# Patient Record
Sex: MALE | Race: WHITE
[De-identification: names, ages, dates, MRNs, and addresses within clinical notes are randomized per-mention and may not be internally consistent; named-entity substitution may affect disease eponyms.]

---

## 2018-03-30 ENCOUNTER — HOSPITAL ENCOUNTER (EMERGENCY)
Dept: HOSPITAL 76 - ED | Age: 53
Discharge: HOME | End: 2018-03-30
Payer: COMMERCIAL

## 2018-03-30 VITALS — DIASTOLIC BLOOD PRESSURE: 73 MMHG | SYSTOLIC BLOOD PRESSURE: 144 MMHG

## 2018-03-30 DIAGNOSIS — S61.253A: ICD-10-CM

## 2018-03-30 DIAGNOSIS — R03.0: ICD-10-CM

## 2018-03-30 DIAGNOSIS — Z23: ICD-10-CM
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
## 2018-03-30 DIAGNOSIS — S61.452A: Primary | ICD-10-CM

## 2018-03-30 PROCEDURE — 12002 RPR S/N/AX/GEN/TRNK2.6-7.5CM: CPT

## 2018-03-30 PROCEDURE — 99283 EMERGENCY DEPT VISIT LOW MDM: CPT

## 2018-03-30 PROCEDURE — 73130 X-RAY EXAM OF HAND: CPT

## 2018-03-30 PROCEDURE — 90715 TDAP VACCINE 7 YRS/> IM: CPT

## 2018-03-30 PROCEDURE — 90471 IMMUNIZATION ADMIN: CPT

## 2018-03-30 NOTE — ED PHYSICIAN DOCUMENTATION
PD HPI UPPER EXT INJURY





- Stated complaint


Stated Complaint: DOG BITE





- Chief complaint


Chief Complaint: Laceration





- History obtained from


History obtained from: Patient





- History of Present Illness


Location: Other (Right-handed gentleman with unknown tetanus status was 

breaking up a fight between his own and another dog just prior to arrival near 

home in [place] and has multiple cuts and punctures over the left hand.  No 

other injuries.)





Review of Systems


Constitutional: denies: Fever, Chills


Cardiac: reports: Reviewed and negative


Respiratory: reports: Reviewed and negative


GI: reports: Reviewed and negative





PD PAST MEDICAL HISTORY





- Past Medical History


Neuro: None


Endocrine/Autoimmune: None





- Past Surgical History


Past Surgical History: No





- Present Medications


Home Medications: 


 Ambulatory Orders











 Medication  Instructions  Recorded  Confirmed


 


FLUoxetine [PROzac] 10 mg 12/05/16 


 


Mometasone Furoate [Nasonex] 17 gm NS DAILY 12/05/16 12/05/16


 


Amox/Clav 875/125 [Augmentin] 1 each PO Q12H #14 tablet 03/30/18 


 


Levothyroxine [Synthroid] 20 mcg PO QDAC 03/30/18 03/30/18














- Allergies


Allergies/Adverse Reactions: 


 Allergies











Allergy/AdvReac Type Severity Reaction Status Date / Time


 


No Known Drug Allergies Allergy   Verified 03/30/18 15:48














- Social History


Does the pt smoke?: No


Smoking Status: Never smoker





- Immunizations


Immunizations are current?: Yes





PD ED PE NORMAL





- Vitals


Vital signs reviewed: Yes





- General


General: Alert and oriented X 3, No acute distress





- Extremities


Extremities: Other (There is a 2 cm laceration on the ulnar side of the dorsal 

hand proximal to the fifth MCP and another smaller one just distal that.  There 

are multiple punctures about the left ring finger.  There is a 1-1/2 cm 

laceration over the dorsum of the left middle finger at the level of the PIP 

and there is a little lac near the medial nailbed of the 4th finger.)





- Neuro


Neuro: Alert and oriented X 3, Normal speech





Results





- Vitals


Vitals: 


 Vital Signs - 24 hr











  03/30/18





  15:44


 


Temperature 36.8 C


 


Heart Rate 72


 


Respiratory 12





Rate 


 


Blood Pressure 135/67 H


 


O2 Saturation 99








 Oxygen











O2 Source                      Room air

















Procedures





- Laceration (location)


  ** L hand


Length in cm: 3


Wound type: Other (1 1cm lac in the middle finger, NVI and 1 2cm lac prox to 

5th MCP dorsal hand)


Neurovascular status: Sensory intact, Motor intact, Vascular intact


Tendon involvement: Tendon intact


Anesthesia: Lidocaine 1%


Wound Preparation: Hibiclens, Irrigated copiously NS


Skin layer closure: Nylon, Size #-0 - enter number (4-0), Sutures - enter # (5, 

2 in the middle finger and 3 in the dorsal hand)


Other: Tetanus booster given


Complexity: Simple





Departure





- Departure


Disposition: 01 Home, Self Care


Clinical Impression: 


Dog bite, hand


Qualifiers:


 Encounter type: initial encounter Laterality: left Qualified Code(s): S61.452A 

- Open bite of left hand, initial encounter; W54.0XXA - Bitten by dog, initial 

encounter; W54.0XXA - Bitten by dog, initial encounter





Record reviewed to determine appropriate education?: Yes


Instructions:  ED Laceration Hand


Prescriptions: 


Amox/Clav 875/125 [Augmentin] 1 each PO Q12H #14 tablet


Comments: 


Come back for any signs of infection which would include: Redness, swelling, 

drainage, increased pain, or fevers.


Follow-up with your physician in 14 days for suture removal.





Your blood pressure was elevated today on check into the emergency department.  

This does not mean that you have hypertension, it is a common phenomenon to 

come to the emergency department and have elevated blood pressure.  I recommend 

that you see your primary care physician within the week to have it rechecked 

when you are feeling better.

## 2018-03-30 NOTE — XRAY REPORT
EXAM:

LEFT HAND RADIOGRAPHY

 

EXAM DATE: 3/30/2018 04:38 PM.

 

CLINICAL HISTORY: Dog bite to left hand. Lacerations and pain to fourth digit.

 

COMPARISON: None.

 

TECHNIQUE: 3 views. 4 films.

 

FINDINGS: 

Bones: Normal. No fractures or bone lesions.

 

Joints: Normal. No subluxations.

 

Soft Tissues: Normal. No soft tissue swelling. No radiopaque foreign body.

 

IMPRESSION: Normal left hand radiography.

 

[facility]

Referring Provider Line: [phone]

 

SITE ID: [id number]

## 2018-10-31 ENCOUNTER — HOSPITAL ENCOUNTER (OUTPATIENT)
Dept: HOSPITAL 76 - DI | Age: 53
Discharge: HOME | End: 2018-10-31
Attending: NEUROLOGICAL SURGERY
Payer: COMMERCIAL

## 2018-10-31 DIAGNOSIS — R13.10: Primary | ICD-10-CM

## 2018-10-31 PROCEDURE — 74230 X-RAY XM SWLNG FUNCJ C+: CPT

## 2018-11-02 NOTE — XRAY REPORT
Reason:  DYSPHAGIA

Procedure Date:  10/31/2018   

Accession Number:  [id number] / [id number]                    

Procedure:  FL  - Modified Barium Swallow W/SP CPT Code:  

 

FULL RESULT:

 

 

EXAM:

MODIFIED BARIUM SWALLOW

 

EXAM DATE: 10/31/2018 01:45 PM.

 

CLINICAL HISTORY: Dysphagia.

 

COMPARISON: None.

 

TECHNIQUE: Under the direction of speech pathology, patient swallowed 

various consistencies of barium under lateral fluoroscopic observation of 

the neck.

Fluoroscopy Time: 33 seconds.

Number of Images: 23.

 

FINDINGS:

Swallowing Mechanism: Normal oral phase and swallowing reflex.

 

Airway Protection: Normal epiglottic motion. No episodes of tracheal 

penetration or aspiration with all consistencies of barium.

 

Pharynx: Normal. No significant vallecular or piriform sinus contrast 

pooling.

 

Other: None.

IMPRESSION:

Normal modified barium swallow. No aspiration identified.

 

[facility]

## 2019-02-16 ENCOUNTER — HOSPITAL ENCOUNTER (OUTPATIENT)
Dept: HOSPITAL 76 - DI | Age: 54
Discharge: HOME | End: 2019-02-16
Attending: FAMILY MEDICINE
Payer: COMMERCIAL

## 2019-02-16 DIAGNOSIS — K40.90: Primary | ICD-10-CM

## 2019-02-16 PROCEDURE — 76857 US EXAM PELVIC LIMITED: CPT

## 2019-02-16 NOTE — ULTRASOUND REPORT
Reason:  R INGUINAL HERNIA, REDUCABLE

Procedure Date:  02/16/2019   

Accession Number:  [id number] / [id number]                    

Procedure:  US  - Pelvic Limited or F/U CPT Code:  

 

FULL RESULT:

 

 

EXAM:

INGUINAL ULTRASOUND

 

EXAM DATE: 2/16/2019 05:06 AM.

 

CLINICAL HISTORY: R INGUINAL HERNIA, REDUCABLE.

 

COMPARISON: None.

 

TECHNIQUE: Real-time sonographic imaging of the inguinal canals and 

vascular structures, including color-flow, was performed by the 

sonographer. Multiple representative static images were saved for review.

 

FINDINGS:

Hernia: Fat-containing right inguinal hernia, with the hernia neck 

measuring 1.7 cm. No bowel herniation identified. Hernia is seen during 

Valsalva, and reduces post-Valsalva.

 

Soft Tissues: Normal. No fluid collections or adenopathy.

 

Other: None.

IMPRESSION: Fat-containing right inguinal hernia. No evidence of bowel 

herniation.

 

[facility]

## 2019-03-19 ENCOUNTER — HOSPITAL ENCOUNTER (OUTPATIENT)
Dept: HOSPITAL 76 - SDS | Age: 54
Discharge: HOME | End: 2019-03-19
Attending: SURGERY
Payer: COMMERCIAL

## 2019-03-19 VITALS — DIASTOLIC BLOOD PRESSURE: 78 MMHG | SYSTOLIC BLOOD PRESSURE: 123 MMHG

## 2019-03-19 DIAGNOSIS — G47.30: ICD-10-CM

## 2019-03-19 DIAGNOSIS — Z79.51: ICD-10-CM

## 2019-03-19 DIAGNOSIS — E03.9: ICD-10-CM

## 2019-03-19 DIAGNOSIS — F41.0: ICD-10-CM

## 2019-03-19 DIAGNOSIS — F32.9: ICD-10-CM

## 2019-03-19 DIAGNOSIS — J45.909: ICD-10-CM

## 2019-03-19 DIAGNOSIS — K40.90: Primary | ICD-10-CM

## 2019-03-19 PROCEDURE — 0VBF4ZZ EXCISION OF RIGHT SPERMATIC CORD, PERCUTANEOUS ENDOSCOPIC APPROACH: ICD-10-PCS | Performed by: SURGERY

## 2019-03-19 PROCEDURE — 49650 LAP ING HERNIA REPAIR INIT: CPT

## 2019-03-19 PROCEDURE — 0YU54JZ SUPPLEMENT RIGHT INGUINAL REGION WITH SYNTHETIC SUBSTITUTE, PERCUTANEOUS ENDOSCOPIC APPROACH: ICD-10-PCS | Performed by: SURGERY

## 2019-03-19 NOTE — ANESTHESIA
Pre-Anesthesia VS, & Labs





- Diagnosis





R inguinal hernia





- Procedure





R laparoscopic inguinal hernia repair, possible open





                                        





Height                           5 ft 9 in


Body Mass Index                  28.0











                                        





Height                           5 ft 9 in


Body Mass Index                  28.0











- NPO


>8 hours





Home Medications and Allergies


Home Medications: 


Ambulatory Orders





Albuterol Sulfate [Proair Hfa Inhaler] 1 - 2 puffs INH Q4H PRN 03/07/19 


Beclomethasone Dipropionate [Qvar Redihaler (40 mcg)] 2 puffs IH BID 03/07/19 


Cyclobenzaprine [Flexeril] 10 mg PO TID PRN 03/07/19 


LORazepam [Ativan] 0.5 mg PO ONCE PRN 03/07/19 


Liothyronine [Cytomel] 5 mcg PO QDAC 03/07/19 


Sildenafil Citrate [Viagra] 100 mg PO ONCE PRN 03/07/19 











                                        





FLUoxetine [PROzac] 20 mg PO DAILY 12/05/16 


Mometasone Furoate [Nasonex] 1 spray NS BID PRN 12/05/16 


Albuterol Sulfate [Proair Hfa Inhaler] 1 - 2 puffs INH Q4H PRN 03/07/19 


Beclomethasone Dipropionate [Qvar Redihaler (40 mcg)] 2 puffs IH BID 03/07/19 


Cyclobenzaprine [Flexeril] 10 mg PO TID PRN 03/07/19 


LORazepam [Ativan] 0.5 mg PO ONCE PRN 03/07/19 


Liothyronine [Cytomel] 5 mcg PO QDAC 03/07/19 


Sildenafil Citrate [Viagra] 100 mg PO ONCE PRN 03/07/19 








Allergies/Adverse Reactions: 


                                    Allergies











Allergy/AdvReac Type Severity Reaction Status Date / Time


 


latex Allergy  swelling Verified 03/07/19 11:57














Anes History & Medical History





- Anesthetic History


Anesthesia Complications: reports: No previous complications


Family history of Anesthesia Complications: Denies


Family history of Malignant Hyperthermia: Denies





- Medical History


Cardiovascular: reports: None


Pulmonary: reports: Asthma, Sleep apnea, CPAP use


Gastrointestinal: reports: Ulcers


Urinary: reports: Kidney stones


Musculoskeletal: reports: Chronic back pain


Endocrine/Autoimmune: reports: HyPOthyroidism


Skin: reports: None


Smoking Status: Never smoker





- Surgical History


Orthopedic: Rotator cuff repair, Arthroscopic surgery, Spine surgery





Exam


General: Alert, Oriented x3, Cooperative


Dental: WNL


Mouth Opening: 3 Fingerbreadth


Neck Mobility: Reduced


Mallampati classification: II


Thyromental Distance: 4-6 cm


Respiratory: Lungs clear, Normal breath sounds, No respiratory distress, No 

accessory muscle use


Cardiovascular: Regular rate


Neurological: Normal speech


Mental/Cognitive Status: Alert/Oriented X3


Cognitive Status: Within normal limits





Plan


Anesthesia Type: General


Consent for Procedure(s) Verified and Reviewed: Yes


Code Status: Attempt Resuscitation


ASA classification: 2-Mild systemic disease


Is this case an emergency?: No

## 2019-03-19 NOTE — OPERATIVE REPORT
Operative Report





- General


Procedure Date: 03/19/19


Planned Procedure: 


Laparoscopic right inguinal herniorrhaphy possible open right inguinal 

herniorrhaphy


Pre-Op Diagnosis: Right inguinal hernia


Procedure Performed: 


Laparoscopic (TEP) right inguinal herniorrhaphy and removal of right inguinal 

canal cord lipoma


Post Op Diagnosis: Right indirect inguinal hernia and cord lipoma





- Procedure Note


Primary Surgeon: Joel Sauer MD


Anesthesia Provider: Sukhwinder Rios CRNA and Renato Seth CRNA


Anesthesia Technique: General ET tube, Local (30 mL of half percent Marcaine)


IV Fluids (mL): 1,200


Estimated Blood Loss (mL): 20


Drain/Tube Type: Other (None.)


Complications: 


None.





- Other


Other Information/Narrative: 


OPERATIVE DESCRIPTION/REPORT:





After verbal and written informed consent was obtained detailing the risks of 

infection, bleeding requiring transfusion with its risks, nerve injury, and 

death, and after I met with the patient confirming the surgery and the site of 

the surgery, the patient was brought to the operative suite and placed supine on

the operating table.  Great care was taken to avoid pressure points to prevent 

pressure necrosis or nerve injury.  Monitoring devices were applied along with 

TEDs and pneumatic compressive stockings (to prevent DVT). The patient received 

preoperative antibiotics for surgical prophylaxis.  Sukhwinder Rios CRNA and Renato Seth CRNA sedated and induced general anesthesia and provided anesthesia 

care for the entirety of the case (Renato Lewis completed).  The patient 

was prepped and draped in the usual sterile manner.  With the patient draped my 

initials were clearly visible.  A "time in" then confirmed that the patient was 

identified with 3 identifiers (name, birth date and medical record number), the 

history and physical was in the chart, the signed consent confirming the 

procedure was in the chart, the patient was in the correct position, the 

aforementioned prophylactic measures were in place or given, we had the correct 

personnel and equipment to complete the procedure and that anesthesia, surgery 

and nursing were given an opportunity to express any concerns.  With the 

agreement of everyone in the room, we proceeded with the operation.





A transverse skin incision was made below and to the right of the umbilicus to a

length of approximately 3 cm. The incision was carried through the subcutaneous 

tissue. Bleeders were cauterized. The right rectus sheath was identified and 

incised lateral to the midline. The preperitoneal space was then developed 

following insertion of a Spacemaker balloon, which was inflated under direct 

vision.  The balloon would not inflate fully on the right hand side and in order

to get the right side of the balloon to inflate additional pressure was used to 

the point where the balloon popped but it still did not dissect the right hand 

side.





Following removal of the Spacemaker balloon, a #10 trocar was placed in the 

preperitoneal space and the preperitoneal space was insufflated with CO2 to a 

steady state pressure of 15 mmHg. A 5 mm 30 degree laparoscope was inserted in 

the preperitoneal space.  Two #5 trocars were placed in the lower midline 5 cm 

and 10 cm away from the umbilical incision under direct vision and without 

incident.  Landmarks including symphysis pubis, right and left Freedom ligaments 

and right and left inferior epigastric vessels were identified. Dissection was 

then continued lateral to the transverse abdominis muscle bilaterally. 





The right side was addressed first.  The internal ring was then explored for the

presence of the indirect hernia sac and a very large cord lipoma and a 

relatively small indirect hernia sac were found. Exploration of the medial space

showed no defect consistent with a direct hernia.  A [employer]ien ProGrip right 

inguinal mesh (reference# [id number], lot# [id number], used by date 2021-08-31) was

placed in the preperitoneal space and unrolled to cover the internal ring as 

well as the direct space.  Great care was taken to ensure that the peritoneumas 

well as the large cord lipoma was swept down so that it could not "creep" behind

the mesh and result in a recurrent hernia.





The left side was then addressed.  No direct nor indirect hernia was seen.





30 mL of 1/2% Marcaine was injected at all 3 port sites both deep and 

superficially under direct vision.  





The preperitoneal space was then deflated and during the deflation the mesh was 

watched to ensure that it was sandwiched nicely in place and did not change 

position. All trocars were withdrawn.  The defect in the rectus sheath was 

closed with a running 0 Vicryl suture . The skin incisions were closed with 

subcuticular 4-0 Monocryl suture. The prep was washed off and Dermabond was 

applied at all the incisions.  At this point a time out was performed that 

confirmed that all the counts were correct, the procedure that was performed, 

the blood loss, the IV fluids administered, and the patients condition.  The 

patient's scrotum was evaluated to ensure that the testicles were well seated 

within the scrotum and that there was no swelling.  The prep was washed off and 

Benzoin and Steristrips were applied.  Having tolerated the procedure well, the 

patient was subsequently extubated and taken to recovery room in good and stable

condition.





Dragon disclaimer:





This document was created in part using voice recognition technology.  Because 

of the inherent limitations of the system ([employer]'s Dragon Dictate user manual 

states that the licensee understands that speech recognition is a statistical 

process and that recognition errors are inherent in the process), occasional 

same sounding word substitutions and grammatical errors do occur and persist 

despite proofreading.  Please read this document for context.

## 2019-06-12 ENCOUNTER — HOSPITAL ENCOUNTER (EMERGENCY)
Dept: HOSPITAL 76 - ED | Age: 54
Discharge: HOME | End: 2019-06-12
Payer: COMMERCIAL

## 2019-06-12 VITALS — DIASTOLIC BLOOD PRESSURE: 89 MMHG | SYSTOLIC BLOOD PRESSURE: 139 MMHG

## 2019-06-12 DIAGNOSIS — J45.991: Primary | ICD-10-CM

## 2019-06-12 DIAGNOSIS — G47.30: ICD-10-CM

## 2019-06-12 PROCEDURE — 99283 EMERGENCY DEPT VISIT LOW MDM: CPT

## 2019-06-12 PROCEDURE — 94640 AIRWAY INHALATION TREATMENT: CPT

## 2019-06-12 NOTE — ED PHYSICIAN DOCUMENTATION
PD HPI DYSPNEA





- Stated complaint


Stated Complaint: SOA





- Chief complaint


Chief Complaint: Resp





- History obtained from


History obtained from: Patient





- History of Present Illness


Timing - onset: Yesterday


Timing - onset during: Light activity


Timing - duration: Hours (since last night)


Timing - details: Abrupt onset (after mowing the lawn), Still present, Constant


Inciting event(s): Exposure (ie smoke) (mowing the lawn)


Improved by: Rest


Worsened by: Coughing


Associated symptoms: Cough, Wheezing.  No: Fever, Hemoptysis, Chest pain / 

discomfort, Palpitations, Diaphoresis, Bilateral edema, Unilateral edema


Similar symptoms before: Diagnosis (asthma)


Recently seen: Not recently seen





Review of Systems


Ten Systems: 10 systems reviewed and negative


Constitutional: denies: Fever, Chills


Nose: denies: Congestion


Throat: denies: Sore throat


Cardiac: denies: Chest pain / pressure


Respiratory: reports: Dyspnea, Cough, Wheezing


Skin: reports: Reviewed and negative


Musculoskeletal: reports: Reviewed and negative


Neurologic: reports: Reviewed and negative





PD PAST MEDICAL HISTORY





- Past Medical History


Past Medical History: Yes


Cardiovascular: None


Respiratory: Asthma, Sleep apnea, CPAP use


Endocrine/Autoimmune: HyPOthyroidism


GI: Ulcers


: Kidney stones


HEENT: Chronic vision loss


Psych: Anxiety, Panic attacks, ADD/ADHD


Musculoskeletal: Chronic back pain


Derm: None





- Past Surgical History


Past Surgical History: No


Ortho: Rotator cuff repair, Arthroscopic surgery, Spine surgery





- Present Medications


Home Medications: 


                                Ambulatory Orders











 Medication  Instructions  Recorded  Confirmed


 


FLUoxetine [PROzac] 20 mg PO DAILY 12/05/16 03/19/19


 


Mometasone Furoate [Nasonex] 1 spray NS BID PRN 12/05/16 03/07/19


 


Albuterol Sulfate [Proair Hfa 1 - 2 puffs INH Q4H PRN 03/07/19 03/07/19





Inhaler]   


 


Beclomethasone Dipropionate [Qvar 2 puffs IH BID 03/07/19 03/07/19





Redihaler (40 mcg)]   


 


Cyclobenzaprine [Flexeril] 10 mg PO TID PRN 03/07/19 03/19/19


 


LORazepam [Ativan] 0.5 mg PO ONCE PRN 03/07/19 03/19/19


 


Liothyronine [Cytomel] 5 mcg PO QDAC 03/07/19 03/19/19


 


Sildenafil Citrate [Viagra] 100 mg PO ONCE PRN 03/07/19 03/19/19


 


Benzonatate [Tessalon Perle] 100 - 200 mg PO TID PRN #30 capsule 06/12/19 














- Allergies


Allergies/Adverse Reactions: 


                                    Allergies











Allergy/AdvReac Type Severity Reaction Status Date / Time


 


latex Allergy  swelling Verified 06/12/19 11:07














- Social History


Does the pt smoke?: No


Smoking Status: Never smoker


Does the pt drink ETOH?: Yes


Does the pt have substance abuse?: No





- Immunizations


Immunizations are current?: Yes


Immunizations: TDAP >10years/unknown





PD ED PE NORMAL





- Vitals


Vital signs reviewed: Yes





- General


General: Alert and oriented X 3, No acute distress





- HEENT


HEENT: Atraumatic





- Neck


Neck: Supple, no meningeal sign, No JVD





- Cardiac


Cardiac: RRR, No murmur, No gallop, No rub





- Respiratory


Respiratory: No respiratory distress, Clear bilaterally





- Abdomen


Abdomen: Soft, Non distended





- Male 


Male : Deferred





- Rectal


Rectal: Deferred





- Derm


Derm: Normal color, Warm and dry, No rash





- Extremities


Extremities: No edema





- Neuro


Neuro: Alert and oriented X 3


Eye Opening: Spontaneous


Motor: Obeys Commands


Verbal: Oriented


GCS Score: 15





- Psych


Psych: Normal mood, Normal affect





Results





- Vitals


Vitals: 


                               Vital Signs - 24 hr











  06/12/19 06/12/19





  11:05 11:48


 


Temperature 36 C L 


 


Heart Rate 86 90


 


Respiratory 22 16





Rate  


 


Blood Pressure 139/89 H 


 


O2 Saturation 98 








                                     Oxygen











O2 Source                      Room air

















PD MEDICAL DECISION MAKING





- ED course


Complexity details: considered differential, d/w patient, d/w family


ED course: 


ddx- asthma, bronchitis, uri, allergic reaction, pneumonia, uri





52 y/o M with reported hx of asthma states worsening cough and wheezing after 

mowing lawn yesterday. took inhaler without relief.


Given neb prior to my eval here in the ED and now feels better and has clear 

breath sounds.


Stable vitals.


Likely asthma exacerbation. Still has mild cough - given tessalon perles for 

cough. Pt has inhaler for home. 


One dose of dexamethasone given here.





Departure





- Departure


Disposition: 01 Home, Self Care


Clinical Impression: 


 Cough variant asthma





Condition: Stable


Record reviewed to determine appropriate education?: Yes


Instructions:  Asthma Dc


Follow-Up: 


Marcelo Clayton MD [Primary Care Provider] - As Needed


Prescriptions: 


Benzonatate [Tessalon Perle] 100 - 200 mg PO TID PRN #30 capsule


 PRN Reason: Cough


Comments: 


Take tessalon perles for cough.


Use your inhaler as needed.


Return to the ED if difficulty breathing or new concerning symptoms.

## 2019-06-18 ENCOUNTER — HOSPITAL ENCOUNTER (OUTPATIENT)
Dept: HOSPITAL 76 - DI | Age: 54
Discharge: HOME | End: 2019-06-18
Attending: NATUROPATH
Payer: COMMERCIAL

## 2019-06-18 DIAGNOSIS — R07.9: ICD-10-CM

## 2019-06-18 DIAGNOSIS — R06.2: ICD-10-CM

## 2019-06-18 DIAGNOSIS — R09.89: ICD-10-CM

## 2019-06-18 DIAGNOSIS — J45.909: ICD-10-CM

## 2019-06-18 DIAGNOSIS — R50.9: ICD-10-CM

## 2019-06-18 DIAGNOSIS — R05: Primary | ICD-10-CM

## 2019-06-18 PROCEDURE — 71046 X-RAY EXAM CHEST 2 VIEWS: CPT

## 2019-12-16 ENCOUNTER — HOSPITAL ENCOUNTER (OUTPATIENT)
Dept: HOSPITAL 76 - SC | Age: 54
Discharge: HOME | End: 2019-12-16
Attending: INTERNAL MEDICINE
Payer: COMMERCIAL

## 2019-12-16 DIAGNOSIS — E66.9: ICD-10-CM

## 2019-12-16 DIAGNOSIS — G47.33: Primary | ICD-10-CM

## 2019-12-16 PROCEDURE — 99212 OFFICE O/P EST SF 10 MIN: CPT

## 2019-12-16 PROCEDURE — 99203 OFFICE O/P NEW LOW 30 MIN: CPT

## 2019-12-17 VITALS — DIASTOLIC BLOOD PRESSURE: 86 MMHG | SYSTOLIC BLOOD PRESSURE: 137 MMHG

## 2019-12-17 NOTE — SLEEP CARE CONSULTATION
Information from patient questionnaire entered by Brittni Barnett.





I have reviewed and concur with the information entered by Brittni Barnett. 

This document represents the service I personally performed and the decisions 

made by me, Isa Zhou MD, [facility].





History of Present Illness


Reason for Visit: New patient, Previously diagnosed sleep apnea, sleep apnea on 

CPAP therapy


Chief Complaint: reports: Other (yearly check up)


Duration of Symptoms: many years


Usual bedtime: 9 pm


Time it takes to fall asleep: 60 mins


Snores at night: Yes


Observed to quit breathing while asleep: Yes


Number of times waking at night: 3 with machine


Reasons for waking at night: reports: Choking, Pain, Bathroom


Toss, Turn, or Twitch while sleeping: Yes


Recalls having dreams: No


Usually gets out of bed at: 7 am


Feels refreshed in the morning: No (with machine, yes)


Morning headache: Yes (with out machine)


Sleepy or fatigued during the day: Yes (with out machine)


Ever fallen asleep while driving: Yes


Takes day naps: Yes


Dreams during day naps: No


Prior sleep studies: Yes


Year and Where: 2014? [facility]


Additional HPI information: 


I had the pleasure of seeing Mr. José today regarding obstructive sleep apnea-

hypopnea.  As you know, he is a 54 year old gentleman who was diagnosed with the

sleep-disordered breathing at [facility] in Feb 2013.  The AHI was 17 and

pepe oxygen saturation was 85%.  He was prescribed a CPAP device set at 5  15 

cmH2O.  He uses every night and all night.  The compliance data show usage in 26

out of the past 30 nights, averaging 5.8 hours a night.  The > 4 hour compliance

rate for the past 30 days is 70%.  The residual AHI is 0.7 and average air leak 

is 0.1 L/minute.  He wears nasal pillows.  He gets his supplies from [facility].  He finds the treatment very beneficial.








- Parasomnia Symptoms


Ever been unable to move upon waking from sleep: Yes


Ever felt weak in the knees when startled or emotional: Yes


Bothered by creepy, crawly, restless sensations in legs: No


Problems with memory or concentration: Yes





Subjective


Initial [place] Sleepiness Scale score: 11





Past Medical History


Past Medical History: reports: Hypothyroidism, Anxiety, Asthma, Depression, 

GERD, Attention deficit





Social History


The patient's occupation is a . Patient is Single and lives in 

[place]. 





Have you smoked in the past 12 months: No


Alcohol use: Yes


Alcohol amount and frequency: 1-2 drinks 4 times a week





Family History


Family history of sleep disordered breathing: Yes





Allergies and Home Medications


Drug allergies reviewed: Yes


Home medication list reviewed: Yes


Allergy and home medication list: 


Meds: sildenafil, Nasonex, Cytomel, lorazepam, fluoxetine, cyclobenzaprine, Q-

Lucy, and albuterol inhaler


Allergies: Latex








Review of Systems


Weight gain over past 5 years: 25


Weight loss over past 5 years: 20


Cardiovascular: denies: high blood pressure, palpitations, chest pain, irregular

heart rate or pulse, leg or foot swelling, have to sleep sitting up, other


Respiratory: reports: chronic cough


Gastrointestinal: reports: difficulty swallowing (eating turkey)


Urinary: denies: incontinence, frequency, urgency, impotence, other


Neurological: reports: headaches, fainting or unconsciousness


Psychiatric: reports: Attention Deficit Hyperactivity, anxiety, depression


Ear/Nose/Throat: reports: nasal congestion, sinus problems, hoarseness, injury 

to nose


Endocrine: reports: sluggishness, too hot or cold


Musculoskeletal: reports: joint pain, neck pain, back pain, joint swelling, 

muscle pain or cramping, mobility problems


Immunologic: reports: sneezing, rash, itching, allergies to food or environment





Physical Exam


Vital signs obtained and entered by: Dr. Zhou


Blood Pressure: 137/86


Cuff size: regular


Heart Rate: 86


O2 Saturation: 97


Height: 5 ft 8 in


Weight: 205 lb


Body Mass Index: 31.1


BMI Classification: Obesity Class 1


Neck circumference: 16


Mood/affect: normal


HEENT: No craniofacial malformation


Nostrils: patent to airflow


Turbinates: normal


Septum: midline


Mouth and throat: normal


Soft palate: normal


Hard palate: normal


Uvula: normal


Uvula visualization: 100% Mallampati Class I


Tongue: normal in size


Tonsils: small


Chin and jaw: normal size and position


Neck: normal w/o lymphadenopathy or thyromegaly


Heart: regular rate and rhythm


Lungs: clear bilaterally


Abdomen: soft, non-tender


Extremities: no edema or clubbing


Neurologic: intact, no focal deficits





Impression and Plan


IMPRESSION: 


1. Obstructive Sleep Apnea-Hypopnea Syndrome, moderate, as previously diagnosed.

 The patient has had good treatment compliance.  The current pressure setting 

appears effective and comfortable.  The patient experiences improvement on the 

treatment.  Because the CPAP is now older than the useful life of 5 years, I 

will order the patient a new one and make it an autoCPAP set between 5 and 15 

cmH2O. 





Plan:  


1. Prescription made for an autoCPAP, heated humidifier, and related supplies. 


2. Try newer masks/nasal pillows, e.g. RespirSavellis DreamWear nasal 

cushion/pillows/Wisp nasal mask.


3. Attempt to lose weight.


4. Return for follow up after one month on the new machine.








I spent 100% of this visit face to face with the patient with greater than 50% 

of this was spent time counseling the patient and coordination of care.

## 2021-04-09 ENCOUNTER — HOSPITAL ENCOUNTER (OUTPATIENT)
Dept: HOSPITAL 76 - COV | Age: 56
Discharge: HOME | End: 2021-04-09
Attending: NEUROLOGICAL SURGERY
Payer: COMMERCIAL

## 2021-04-09 DIAGNOSIS — Z01.812: Primary | ICD-10-CM

## 2021-04-09 DIAGNOSIS — Z20.822: ICD-10-CM

## 2021-05-03 ENCOUNTER — HOSPITAL ENCOUNTER (OUTPATIENT)
Dept: HOSPITAL 76 - SC | Age: 56
Discharge: HOME | End: 2021-05-03
Attending: INTERNAL MEDICINE
Payer: COMMERCIAL

## 2021-05-03 DIAGNOSIS — G47.33: Primary | ICD-10-CM

## 2021-05-03 DIAGNOSIS — E66.9: ICD-10-CM

## 2021-05-03 PROCEDURE — 99212 OFFICE O/P EST SF 10 MIN: CPT

## 2021-05-04 NOTE — SLEEP CARE CONSULTATION
Information from patient questionnaire entered by Earl Johnson.





I have reviewed and concur with the information entered by Earl Johnson. This 

document represents the service I personally performed and the decisions made by

me, Isa Zhou MD, [facility].





History of Present Illness


Service Date and Time: 05/03/2021 0928


Previous diagnosis: Moderate, Obstructive Sleep Apnea-Hypopnea Syndrome


AHI: 17


Reason for follow up: annual (Last seen 12/2019)


Equipment type: CPAP


Equipment obtained from: [employer]


Mask style: Nasal pillows


Prior sleep studies: Yes


Year and Where: 2013 [facility]


HPI additional information: 


HPI:  Mr. José was diagnosed to have moderate obstructive sleep apnea-hypopnea 

syndrome and returns today for follow up of CPAP therapy.  The patient purchased

the device from [employer] who is unhappy with.  He wears ResMed P30i nasal 

pillows.  He uses the device nightly and all through the night. The compliance 

report shows that he uses the device 113 nights out of the past 180 nights, 

averaging 6.2 hours a night.  The > 4 hour compliance rate for the past 180 days

is 53% (the days he did not use the ResMed AirSense 10, he used his other 

machines).  He complains of the nasal pillows hurting his nose.  No particular 

problem with the device such as soreness on the face, dry nose, epistaxis, nasal

congestion or headache.  He thinks that the pressure of 5 - 15 cmH2O is 

comfortable.  On the CPAP therapy he notices improvement in his sleep quality, 

and that he wakes up feeling fresher in the morning and more awake/alert during 

the day.  [place] Sleepiness Scale score is 8.  The average residual AHI is 0.6;

and average air leak is 8.6 minutes a night.  The 90th percentile pressure is 

11.7 cmH2O. 








CPAP Compliance Data





- Data Reviewed with Patient


Average duration of nightly device use: 6 h 9 min


Compliance rate %: 53


Current pressure setting (cmH2O): 5-15


Average residual AHI: 0.6





Subjective


Missed days of use due to: reports: illness, travel


Patient concerns: reports: mask discomfort (facial)


Current pressure setting perceived as: comfortable


Initial [place] Sleepiness Scale score: 11 (in 2019)


Current [place] Sleepiness Scale score: 8





Allergies and Home Medications


Drug allergies reviewed: Yes


Home medication list reviewed: Yes





Review of Systems


Review of systems same as previous: Yes





Physical Exam


Height: 5 ft 8 in


Weight: 200 lb


Body Mass Index: 30.4


BMI Classification: Obese





Impression and Plan


IMPRESSION: 1. Obstructive Sleep Apnea-Hypopnea Syndrome, moderate (AHI was 17 

at [facility] in 2013), with the patient continuing to do well on nasal 

CPAP therapy.  He has excellent compliance and significant clinical benefits.  

The current pressure appears effective and comfortable.  Overall, he is very 

satisfied with treatment and plans to continue with it long-term.  No adjustment

is necessary today.





PLAN:      1.   Continue with autoCPAP set at 5 - 15 cm H2O.


2.   Prescription made for supplies so that he may switch durable medical 

supplier.


3.   Try ResMed N30i mask and Respironics DreamWear nasal cushion mask 


4.   Try to lose weight. 


5.   Return in one year for follow up or earlier if there is any problem with 

the treatment. 





Counseling Topics: Weight control


Visit Type: In Office


Time Spent with Patient (minutes): 15


Provider Statement: I spent 100% of the Face to Face Visit with the patient with

greater than 50% spent counseling the patient and coordination of care.

## 2022-05-10 NOTE — XRAY REPORT
Reason:  FEVER,COUGHING,RHONCHI,CHEST PAIN,WHEEZING

Procedure Date:  06/18/2019   

Accession Number:  [id number] / [id number]                    

Procedure:  XR  - Chest 2 View X-Ray CPT Code:  52457

 

FULL RESULT:

 

 

EXAM:

CHEST RADIOGRAPHY

 

EXAM DATE: 6/18/2019 02:26 PM.

 

CLINICAL HISTORY: Fever and cough.

 

COMPARISON: None.

 

TECHNIQUE: 2 views.

 

FINDINGS:

Lungs/Pleura: No focal opacities evident. No pleural effusion. No 

pneumothorax. Normal volumes.

 

Mediastinum: Heart and mediastinal contours are unremarkable.

 

Other: Spinal fusion hardware is seen in the cervical spine region.

IMPRESSION: No acute cardiopulmonary abnormality demonstrated.

 

RADIA Yes

## 2023-07-26 ENCOUNTER — HOSPITAL ENCOUNTER (OUTPATIENT)
Dept: HOSPITAL 76 - DI.S | Age: 58
Discharge: HOME | End: 2023-07-26
Attending: FAMILY MEDICINE
Payer: COMMERCIAL

## 2023-07-26 DIAGNOSIS — R53.1: ICD-10-CM

## 2023-07-26 DIAGNOSIS — M54.2: Primary | ICD-10-CM

## 2023-07-26 DIAGNOSIS — M54.6: ICD-10-CM

## 2023-07-26 DIAGNOSIS — R20.2: ICD-10-CM

## 2023-07-26 NOTE — XRAY REPORT
PROCEDURE:  Cervical Spine 2 View

 

INDICATIONS:  NECK PAIN

 

TECHNIQUE:  4 views of the cervical spine were acquired.  

 

COMPARISON:  None.

 

FINDINGS:  

 

Bones:  Postsurgical changes are seen from anterior cervical disc fusion at C5-C7 and disc replacemen
t at C4-5. Joint space narrowing and degenerative endplate changes are seen at C3-4. There is mild in
tervertebral joint and facet hypertrophy. No acute osseous fracture. The lateral masses of C1 appear 
intact on the odontoid view.  No suspicious bony lesions.  

 

Soft tissues:  No prevertebral soft tissue swelling.  Surgical clips are seen in the left neck.

 

IMPRESSION:  Postsurgical changes at C3-4 through C6-7. No acute osseous abnormality. If symptoms per
sist or there is continued clinical concern, further evaluation with MRI or CT may be helpful.

 

 

 

Reviewed by: Tye Klein MD on 7/26/2023 8:23 PM PDT

Approved by: Tye Klein MD on 7/26/2023 8:23 PM PDT

 

 

Station ID:  [id number]

## 2023-07-26 NOTE — XRAY REPORT
PROCEDURE:  Thoracic Spine 2 View

 

INDICATIONS:  BACK PAIN

 

TECHNIQUE:  2 views of the thoracic spine were acquired.  

 

COMPARISON:  None.

 

FINDINGS:  

 

Bones:  No acute fractures or dislocations.  Postsurgical changes are seen in the included lower cerv
ical spine. No suspicious bony lesions.  12 pairs of ribs are noted, and appear intact where visualiz
ed.  

 

Soft tissues:  No paravertebral stripe thickening.  

 

IMPRESSION:  

No acute osseous abnormality. If symptoms persist or there is continued clinical concern, further sanjuana
luation with MRI or CT may be helpful.

 

Reviewed by: Tye Klein MD on 7/26/2023 8:24 PM PDT

Approved by: Tye Klein MD on 7/26/2023 8:24 PM PDT

 

 

Station ID:  [id number]

## 2024-02-09 ENCOUNTER — HOSPITAL ENCOUNTER (OUTPATIENT)
Dept: HOSPITAL 76 - LAB.S | Age: 59
Discharge: HOME | End: 2024-02-09
Attending: FAMILY MEDICINE
Payer: COMMERCIAL

## 2024-02-09 DIAGNOSIS — R53.83: ICD-10-CM

## 2024-02-09 DIAGNOSIS — E78.5: Primary | ICD-10-CM

## 2024-02-09 DIAGNOSIS — E55.9: ICD-10-CM

## 2024-02-09 DIAGNOSIS — N40.1: ICD-10-CM

## 2024-02-09 DIAGNOSIS — E03.9: ICD-10-CM

## 2024-02-09 LAB
ALBUMIN DIAFP-MCNC: 4.5 G/DL (ref 3.2–5.5)
ALBUMIN/GLOB SERPL: 2.1 {RATIO} (ref 1–2.2)
ALP SERPL-CCNC: 62 IU/L (ref 42–121)
ALT SERPL W P-5'-P-CCNC: 23 IU/L (ref 10–60)
ANION GAP SERPL CALCULATED.4IONS-SCNC: 6 MMOL/L (ref 6–13)
AST SERPL W P-5'-P-CCNC: 22 IU/L (ref 10–42)
BASOPHILS NFR BLD AUTO: 0.1 10^3/UL (ref 0–0.1)
BASOPHILS NFR BLD AUTO: 1 %
BILIRUB BLD-MCNC: 0.5 MG/DL (ref 0.2–1)
BUN SERPL-MCNC: 25 MG/DL (ref 6–20)
CALCIUM UR-MCNC: 9.6 MG/DL (ref 8.5–10.3)
CHLORIDE SERPL-SCNC: 107 MMOL/L (ref 101–111)
CHOLEST SERPL-MCNC: 152 MG/DL
CLARITY UR REFRACT.AUTO: (no result)
CO2 SERPL-SCNC: 25 MMOL/L (ref 21–32)
CREAT SERPLBLD-SCNC: 0.8 MG/DL (ref 0.6–1.3)
CRP SERPL HS-MCNC: 1.4 MG/L
EOSINOPHIL # BLD AUTO: 0.1 10^3/UL (ref 0–0.7)
EOSINOPHIL NFR BLD AUTO: 2.7 %
ERYTHROCYTE [DISTWIDTH] IN BLOOD BY AUTOMATED COUNT: 12.8 % (ref 12–15)
EST. AVERAGE GLUCOSE BLD GHB EST-MCNC: 103 MG/DL (ref 70–100)
GFRSERPLBLD MDRD-ARVRAT: 100 ML/MIN/{1.73_M2} (ref 89–?)
GLOBULIN SER-MCNC: 2.1 G/DL (ref 2.1–4.2)
GLUCOSE SERPL-MCNC: 103 MG/DL (ref 74–104)
GLUCOSE UR QL STRIP.AUTO: NEGATIVE MG/DL
HBA1C MFR BLD HPLC: 5.2 % (ref 4.27–6.07)
HCT VFR BLD AUTO: 41 % (ref 42–52)
HDLC SERPL-MCNC: 77 MG/DL
HDLC SERPL: 2 {RATIO} (ref ?–5)
HGB UR QL STRIP: 13.5 G/DL (ref 14–18)
KETONES UR QL STRIP.AUTO: (no result) MG/DL
LDLC SERPL CALC-MCNC: 62 MG/DL
LDLC/HDLC SERPL: 0.8 {RATIO} (ref ?–3.6)
LYMPHOCYTES # SPEC AUTO: 1.2 10^3/UL (ref 1.5–3.5)
LYMPHOCYTES NFR BLD AUTO: 25.6 %
MCH RBC QN AUTO: 29.6 PG (ref 27–31)
MCHC RBC AUTO-ENTMCNC: 32.9 G/DL (ref 32–36)
MCV RBC AUTO: 89.9 FL (ref 80–94)
MONOCYTES # BLD AUTO: 0.5 10^3/UL (ref 0–1)
MONOCYTES NFR BLD AUTO: 10.6 %
NEUTROPHILS # BLD AUTO: 2.9 10^3/UL (ref 1.5–6.6)
NEUTROPHILS # SNV AUTO: 4.8 X10^3/UL (ref 4.8–10.8)
NEUTROPHILS NFR BLD AUTO: 59.7 %
NITRITE UR QL STRIP.AUTO: NEGATIVE
NRBC # BLD AUTO: 0 /100WBC
NRBC # BLD AUTO: 0 X10^3/UL
PDW BLD AUTO: 11.6 FL (ref 7.4–11.4)
PH UR STRIP.AUTO: 6 PH (ref 5–7.5)
PLATELET # BLD: 253 10^3/UL (ref 130–450)
POTASSIUM SERPL-SCNC: 4.3 MMOL/L (ref 3.5–4.5)
PROT SPEC-MCNC: 6.6 G/DL (ref 6.4–8.9)
PROT UR STRIP.AUTO-MCNC: NEGATIVE MG/DL
RBC # UR STRIP.AUTO: NEGATIVE /UL
RBC MAR: 4.56 10^6/UL (ref 4.7–6.1)
SODIUM SERPLBLD-SCNC: 138 MMOL/L (ref 135–145)
SP GR UR STRIP.AUTO: >=1.03 (ref 1–1.03)
SQUAMOUS URNS QL MICRO: (no result)
TRIGL P FAST SERPL-MCNC: 67 MG/DL (ref 48–352)
TSH SERPL-ACNC: 1.29 UIU/ML (ref 0.34–5.6)
UROBILINOGEN UR QL STRIP.AUTO: (no result) E.U./DL
UROBILINOGEN UR STRIP.AUTO-MCNC: NEGATIVE MG/DL
VLDLC SERPL-SCNC: 13 MG/DL
WBC # UR MANUAL: (no result) /HPF (ref 0–3)

## 2024-02-09 PROCEDURE — 84482 T3 REVERSE: CPT

## 2024-02-09 PROCEDURE — 86141 C-REACTIVE PROTEIN HS: CPT

## 2024-02-09 PROCEDURE — 83090 ASSAY OF HOMOCYSTEINE: CPT

## 2024-02-09 PROCEDURE — 82306 VITAMIN D 25 HYDROXY: CPT

## 2024-02-09 PROCEDURE — 83036 HEMOGLOBIN GLYCOSYLATED A1C: CPT

## 2024-02-09 PROCEDURE — 85025 COMPLETE CBC W/AUTO DIFF WBC: CPT

## 2024-02-09 PROCEDURE — 36415 COLL VENOUS BLD VENIPUNCTURE: CPT

## 2024-02-09 PROCEDURE — 84270 ASSAY OF SEX HORMONE GLOBUL: CPT

## 2024-02-09 PROCEDURE — 82626 DEHYDROEPIANDROSTERONE: CPT

## 2024-02-09 PROCEDURE — 84402 ASSAY OF FREE TESTOSTERONE: CPT

## 2024-02-09 PROCEDURE — 84439 ASSAY OF FREE THYROXINE: CPT

## 2024-02-09 PROCEDURE — 83721 ASSAY OF BLOOD LIPOPROTEIN: CPT

## 2024-02-09 PROCEDURE — 84443 ASSAY THYROID STIM HORMONE: CPT

## 2024-02-09 PROCEDURE — 84480 ASSAY TRIIODOTHYRONINE (T3): CPT

## 2024-02-09 PROCEDURE — 80053 COMPREHEN METABOLIC PANEL: CPT

## 2024-02-09 PROCEDURE — 87086 URINE CULTURE/COLONY COUNT: CPT

## 2024-02-09 PROCEDURE — 81001 URINALYSIS AUTO W/SCOPE: CPT

## 2024-02-09 PROCEDURE — 80061 LIPID PANEL: CPT

## 2024-02-09 PROCEDURE — 84481 FREE ASSAY (FT-3): CPT

## 2024-02-09 PROCEDURE — 84403 ASSAY OF TOTAL TESTOSTERONE: CPT

## 2024-02-12 LAB — 25(OH)D3+25(OH)D2 SERPL-MCNC: 49.6 NG/ML (ref 30–100)

## 2024-02-14 LAB
SHBG SERPL-SCNC: 43.7 NMOL/L (ref 19.3–76.4)
TESTOST FREE SERPL-MCNC: 12.6 PG/ML (ref 7.2–24)
TESTOST SERPL-MCNC: 332 NG/DL (ref 264–916)

## 2024-03-08 ENCOUNTER — HOSPITAL ENCOUNTER (OUTPATIENT)
Dept: HOSPITAL 76 - DI.S | Age: 59
Discharge: HOME | End: 2024-03-08
Attending: FAMILY MEDICINE
Payer: COMMERCIAL

## 2024-03-08 DIAGNOSIS — M16.0: Primary | ICD-10-CM

## 2024-03-08 NOTE — XRAY REPORT
PROCEDURE:  Hips w/Pelvis 2-3V BL

 

INDICATIONS:  HIP PAIN

 

TECHNIQUE:  3 view(s) of the hip were acquired.  

 

COMPARISON:  None.

 

FINDINGS:  

 

Bones:  No fractures or dislocations. Minimal bilateral hip DJD. No avascular necrosis of the femoral
 heads. No suspicious bony lesions.  The visualized pelvic ring appears intact.  

 

Soft tissues:  No suspicious soft tissue calcifications or masses.  

 

 

IMPRESSION:  

Minimal bilateral hip DJD.

 

Reviewed by: Parveen Still MD on 3/8/2024 3:45 PM PST

Approved by: Parveen Still MD on 3/8/2024 3:45 PM [facility]

 

 

Station ID:  [id number]

## 2024-04-03 ENCOUNTER — HOSPITAL ENCOUNTER (OUTPATIENT)
Dept: HOSPITAL 76 - DI.S | Age: 59
Discharge: HOME | End: 2024-04-03
Attending: FAMILY MEDICINE
Payer: COMMERCIAL

## 2024-04-03 DIAGNOSIS — R22.42: ICD-10-CM

## 2024-04-03 DIAGNOSIS — S99.812A: Primary | ICD-10-CM

## 2024-04-03 NOTE — XRAY REPORT
PROCEDURE:  Ankle 3+V LT

 

INDICATIONS:  INJURY TO LEFT ANKLE

 

TECHNIQUE:  3 views of the ankle were acquired.  

 

COMPARISON:  None.

 

FINDINGS:  

 

Bones:  No fractures or dislocations.  Ankle mortise is normally aligned.  No suspicious bony lesions
.  

 

Soft tissues:  No tibiotalar joint effusion.  Achilles tendon appears normal.  

 

 

IMPRESSION:  

 

No acute bony abnormality.

 

 

 

Reviewed by: Gian Garcia MD on 4/3/2024 3:27 PM PDT

Approved by: Gian Garcia MD on 4/3/2024 3:27 PM PDT

 

 

Station ID:  [id number]

## 2024-04-10 ENCOUNTER — HOSPITAL ENCOUNTER (OUTPATIENT)
Dept: HOSPITAL 76 - LAB.S | Age: 59
Discharge: HOME | End: 2024-04-10
Attending: FAMILY MEDICINE
Payer: COMMERCIAL

## 2024-04-10 DIAGNOSIS — R53.83: ICD-10-CM

## 2024-04-10 DIAGNOSIS — E03.9: ICD-10-CM

## 2024-04-10 DIAGNOSIS — D64.9: Primary | ICD-10-CM

## 2024-04-10 LAB
ALBUMIN DIAFP-MCNC: 4.5 G/DL (ref 3.2–5.5)
ALBUMIN/GLOB SERPL: 1.7 {RATIO} (ref 1–2.2)
ALP SERPL-CCNC: 65 IU/L (ref 42–121)
ALT SERPL W P-5'-P-CCNC: 18 IU/L (ref 10–60)
ANION GAP SERPL CALCULATED.4IONS-SCNC: 5 MMOL/L (ref 6–13)
AST SERPL W P-5'-P-CCNC: 16 IU/L (ref 10–42)
BASOPHILS NFR BLD AUTO: 0.1 10^3/UL (ref 0–0.1)
BASOPHILS NFR BLD AUTO: 1 %
BILIRUB BLD-MCNC: 0.7 MG/DL (ref 0.2–1)
BUN SERPL-MCNC: 20 MG/DL (ref 6–20)
CALCIUM UR-MCNC: 10.4 MG/DL (ref 8.5–10.3)
CHLORIDE SERPL-SCNC: 103 MMOL/L (ref 101–111)
CO2 SERPL-SCNC: 30 MMOL/L (ref 21–32)
CREAT SERPLBLD-SCNC: 0.8 MG/DL (ref 0.6–1.3)
EOSINOPHIL # BLD AUTO: 0.1 10^3/UL (ref 0–0.7)
EOSINOPHIL NFR BLD AUTO: 2 %
ERYTHROCYTE [DISTWIDTH] IN BLOOD BY AUTOMATED COUNT: 12.1 % (ref 12–15)
FERRITIN SERPL-SCNC: 221.3 NG/ML (ref 23.9–336.2)
GFRSERPLBLD MDRD-ARVRAT: 100 ML/MIN/{1.73_M2} (ref 89–?)
GLOBULIN SER-MCNC: 2.6 G/DL (ref 2.1–4.2)
GLUCOSE SERPL-MCNC: 112 MG/DL (ref 74–104)
HCT VFR BLD AUTO: 45.6 % (ref 42–52)
HGB UR QL STRIP: 14.8 G/DL (ref 14–18)
IRON SATN MFR SERPL: 39 % (ref 20–50)
IRON SERPL-MCNC: 130 UG/DL (ref 50–212)
LYMPHOCYTES # SPEC AUTO: 1.4 10^3/UL (ref 1.5–3.5)
LYMPHOCYTES NFR BLD AUTO: 23.6 %
MCH RBC QN AUTO: 29.5 PG (ref 27–31)
MCHC RBC AUTO-ENTMCNC: 32.5 G/DL (ref 32–36)
MCV RBC AUTO: 90.8 FL (ref 80–94)
MONOCYTES # BLD AUTO: 0.4 10^3/UL (ref 0–1)
MONOCYTES NFR BLD AUTO: 7.1 %
NEUTROPHILS # BLD AUTO: 4 10^3/UL (ref 1.5–6.6)
NEUTROPHILS # SNV AUTO: 6.1 X10^3/UL (ref 4.8–10.8)
NEUTROPHILS NFR BLD AUTO: 65.6 %
NRBC # BLD AUTO: 0 /100WBC
NRBC # BLD AUTO: 0 X10^3/UL
PDW BLD AUTO: 10.8 FL (ref 7.4–11.4)
PLATELET # BLD: 285 10^3/UL (ref 130–450)
POTASSIUM SERPL-SCNC: 4 MMOL/L (ref 3.5–4.5)
PROT SPEC-MCNC: 7.1 G/DL (ref 6.4–8.9)
RBC MAR: 5.02 10^6/UL (ref 4.7–6.1)
SODIUM SERPLBLD-SCNC: 138 MMOL/L (ref 135–145)
TIBC SERPL-MCNC: 336 UG/DL (ref 250–450)
TRANSFERRIN SERPL-MCNC: 240 MG/DL (ref 203–362)
TSH SERPL-ACNC: 1.47 UIU/ML (ref 0.34–5.6)

## 2024-04-10 PROCEDURE — 84482 T3 REVERSE: CPT

## 2024-04-10 PROCEDURE — 84466 ASSAY OF TRANSFERRIN: CPT

## 2024-04-10 PROCEDURE — 82728 ASSAY OF FERRITIN: CPT

## 2024-04-10 PROCEDURE — 80053 COMPREHEN METABOLIC PANEL: CPT

## 2024-04-10 PROCEDURE — 84480 ASSAY TRIIODOTHYRONINE (T3): CPT

## 2024-04-10 PROCEDURE — 84439 ASSAY OF FREE THYROXINE: CPT

## 2024-04-10 PROCEDURE — 85025 COMPLETE CBC W/AUTO DIFF WBC: CPT

## 2024-04-10 PROCEDURE — 36415 COLL VENOUS BLD VENIPUNCTURE: CPT

## 2024-04-10 PROCEDURE — 83540 ASSAY OF IRON: CPT

## 2024-04-10 PROCEDURE — 84443 ASSAY THYROID STIM HORMONE: CPT

## 2024-04-10 PROCEDURE — 84481 FREE ASSAY (FT-3): CPT

## 2024-06-03 ENCOUNTER — HOSPITAL ENCOUNTER (OUTPATIENT)
Dept: HOSPITAL 76 - SDS | Age: 59
Discharge: HOME | End: 2024-06-03
Attending: SURGERY
Payer: COMMERCIAL

## 2024-06-03 VITALS — SYSTOLIC BLOOD PRESSURE: 93 MMHG | DIASTOLIC BLOOD PRESSURE: 53 MMHG | OXYGEN SATURATION: 97 %

## 2024-06-03 DIAGNOSIS — Z79.1: ICD-10-CM

## 2024-06-03 DIAGNOSIS — J45.909: ICD-10-CM

## 2024-06-03 DIAGNOSIS — D50.0: Primary | ICD-10-CM

## 2024-06-03 DIAGNOSIS — R10.13: ICD-10-CM

## 2024-06-03 DIAGNOSIS — G47.30: ICD-10-CM

## 2024-06-03 PROCEDURE — 0DB78ZX EXCISION OF STOMACH, PYLORUS, VIA NATURAL OR ARTIFICIAL OPENING ENDOSCOPIC, DIAGNOSTIC: ICD-10-PCS | Performed by: SURGERY

## 2024-06-03 PROCEDURE — 43239 EGD BIOPSY SINGLE/MULTIPLE: CPT

## 2024-06-03 PROCEDURE — 0DB98ZX EXCISION OF DUODENUM, VIA NATURAL OR ARTIFICIAL OPENING ENDOSCOPIC, DIAGNOSTIC: ICD-10-PCS | Performed by: SURGERY

## 2024-06-03 RX ADMIN — SODIUM CHLORIDE, POTASSIUM CHLORIDE, SODIUM LACTATE AND CALCIUM CHLORIDE ONE MLS: 600; 310; 30; 20 INJECTION, SOLUTION INTRAVENOUS at 08:46

## 2024-06-03 RX ADMIN — SODIUM CHLORIDE, POTASSIUM CHLORIDE, SODIUM LACTATE AND CALCIUM CHLORIDE ONE MLS: 600; 310; 30; 20 INJECTION, SOLUTION INTRAVENOUS at 07:40

## 2024-06-03 NOTE — ANESTHESIA POST OP EVALUATION
Anesthesia Post Eval





- Post Anesthesia Eval


Vitals: 





                                Last Vital Signs











Temp  36.5 C   06/03/24 09:00


 


Pulse  69   06/03/24 09:00


 


Resp  18   06/03/24 09:00


 


BP  93/53 L  06/03/24 09:00


 


Pulse Ox  97   06/03/24 09:00


 


O2 Flow Rate      











CV Function Including HR & BP: Stable


Pain Control: Satisfactory


Nausea & Vomiting: Negative


Mental Status: Baseline


Respiratory Status: Airway Patent


Hydration Status: Satisfactory


Anesthesia Complications: None

## 2024-06-03 NOTE — ANESTHESIA
Pre-Anesthesia VS, & Labs





- Diagnosis





epigastric pain, hx of ulcers, nsaid use





- Procedure





EGD


Vital Signs: 





                                        











Temp Pulse Resp BP Pulse Ox O2 Flow Rate


 


 36.4 C L  74   16   134/83 H  99    


 


 06/03/24 07:15  06/03/24 07:15  06/03/24 07:15  06/03/24 07:15  06/03/24 07:15 

 











Height: 5 ft 8 in


Weight (kg): 91.4 kg


Body Mass Index: 30.6


BMI Classification: Obese





- NPO


>8 hours





Home Medications and Allergies





                                        





FLUoxetine [PROzac] 20 mg PO DAILY 12/05/16 


Mometasone Furoate [Nasonex] 1 spray NS BID PRN 12/05/16 


Albuterol Sulfate [Proair Hfa Inhaler] 1 - 2 puffs INH Q4H PRN 03/07/19 


Beclomethasone Dipropionate [Qvar Redihaler (40 mcg)] 2 puffs IH BID 03/07/19 


LORazepam [Ativan] 0.5 mg PO ONCE PRN 03/07/19 


Liothyronine [Cytomel] 5 mcg PO QDAC 03/07/19 








Allergies/Adverse Reactions: 


                                    Allergies











Allergy/AdvReac Type Severity Reaction Status Date / Time


 


latex Allergy  swelling Verified 06/12/19 11:07














Anes History & Medical History





- Anesthetic History


Anesthesia Complications: reports: No previous complications


Family history of Anesthesia Complications: Denies


Family history of Malignant Hyperthermia: Denies





- Medical History


Cardiovascular: reports: None


Pulmonary: reports: Asthma, Sleep apnea


Gastrointestinal: reports: Ulcers, Other


Urinary: reports: None


Neuro: reports: Other (chronic neck pain)


Musculoskeletal: reports: Osteoarthritis, Chronic back pain


Endocrine/Autoimmune: reports: HyPOthyroidism


Skin: reports: Rosacea


Smoking Status: Never smoker


Psychosocial: reports: No issues indicated





- Surgical History


General: reports: Other


Orthopedic: reports: Rotator cuff repair, Spine surgery, Other





Exam


General: Alert, Oriented x3, Cooperative


Dental: WNL


Mouth Opening: 3 Fingerbreadth


Neck Mobility: Reduced


Mallampati classification: II


Thyromental Distance: 4-6 cm


Respiratory: Lungs clear


Cardiovascular: Regular rate





Plan


Anesthesia Type: General, Total IV


Consent for Procedure(s) Verified and Reviewed: Yes


Code Status: Attempt Resuscitation


ASA classification: 2-Mild systemic disease


Is this case an emergency?: No

## 2024-06-18 ENCOUNTER — HOSPITAL ENCOUNTER (OUTPATIENT)
Dept: HOSPITAL 76 - LAB.S | Age: 59
Discharge: HOME | End: 2024-06-18
Attending: FAMILY MEDICINE
Payer: COMMERCIAL

## 2024-06-18 DIAGNOSIS — E83.52: Primary | ICD-10-CM

## 2024-06-18 DIAGNOSIS — R53.83: ICD-10-CM

## 2024-06-18 LAB
ALBUMIN DIAFP-MCNC: 4.6 G/DL (ref 3.2–5.5)
ALBUMIN/GLOB SERPL: 1.9 {RATIO} (ref 1–2.2)
ALP SERPL-CCNC: 77 IU/L (ref 42–121)
ALT SERPL W P-5'-P-CCNC: 20 IU/L (ref 10–60)
ANION GAP SERPL CALCULATED.4IONS-SCNC: 6 MMOL/L (ref 6–13)
AST SERPL W P-5'-P-CCNC: 16 IU/L (ref 10–42)
BILIRUB BLD-MCNC: 0.6 MG/DL (ref 0.2–1)
BUN SERPL-MCNC: 20 MG/DL (ref 6–20)
CALCIUM UR-MCNC: 9.8 MG/DL (ref 8.5–10.3)
CHLORIDE SERPL-SCNC: 104 MMOL/L (ref 101–111)
CO2 SERPL-SCNC: 27 MMOL/L (ref 21–32)
CREAT SERPLBLD-SCNC: 0.8 MG/DL (ref 0.6–1.3)
GFRSERPLBLD MDRD-ARVRAT: 100 ML/MIN/{1.73_M2} (ref 89–?)
GLOBULIN SER-MCNC: 2.4 G/DL (ref 2.1–4.2)
GLUCOSE SERPL-MCNC: 98 MG/DL (ref 74–104)
POTASSIUM SERPL-SCNC: 4.1 MMOL/L (ref 3.5–4.5)
PROT SPEC-MCNC: 7 G/DL (ref 6.4–8.9)
SODIUM SERPLBLD-SCNC: 137 MMOL/L (ref 135–145)

## 2024-06-18 PROCEDURE — 80053 COMPREHEN METABOLIC PANEL: CPT

## 2024-06-18 PROCEDURE — 82330 ASSAY OF CALCIUM: CPT

## 2024-06-18 PROCEDURE — 36415 COLL VENOUS BLD VENIPUNCTURE: CPT
